# Patient Record
Sex: FEMALE | Race: WHITE | NOT HISPANIC OR LATINO
[De-identification: names, ages, dates, MRNs, and addresses within clinical notes are randomized per-mention and may not be internally consistent; named-entity substitution may affect disease eponyms.]

---

## 2017-06-02 ENCOUNTER — APPOINTMENT (OUTPATIENT)
Dept: DERMATOLOGY | Facility: CLINIC | Age: 47
End: 2017-06-02

## 2017-06-02 VITALS
BODY MASS INDEX: 23.32 KG/M2 | WEIGHT: 140 LBS | SYSTOLIC BLOOD PRESSURE: 106 MMHG | DIASTOLIC BLOOD PRESSURE: 64 MMHG | HEIGHT: 65 IN

## 2017-06-02 DIAGNOSIS — R20.2 PARESTHESIA OF SKIN: ICD-10-CM

## 2017-06-02 DIAGNOSIS — L81.0 POSTINFLAMMATORY HYPERPIGMENTATION: ICD-10-CM

## 2017-06-02 DIAGNOSIS — L30.0 NUMMULAR DERMATITIS: ICD-10-CM

## 2017-06-02 DIAGNOSIS — L73.9 FOLLICULAR DISORDER, UNSPECIFIED: ICD-10-CM

## 2017-06-02 DIAGNOSIS — H54.7 UNSPECIFIED VISUAL LOSS: ICD-10-CM

## 2017-07-03 ENCOUNTER — APPOINTMENT (OUTPATIENT)
Dept: ULTRASOUND IMAGING | Facility: IMAGING CENTER | Age: 47
End: 2017-07-03

## 2017-07-03 ENCOUNTER — OUTPATIENT (OUTPATIENT)
Dept: OUTPATIENT SERVICES | Facility: HOSPITAL | Age: 47
LOS: 1 days | End: 2017-07-03
Payer: COMMERCIAL

## 2017-07-03 DIAGNOSIS — Z00.8 ENCOUNTER FOR OTHER GENERAL EXAMINATION: ICD-10-CM

## 2017-07-03 PROCEDURE — 93970 EXTREMITY STUDY: CPT

## 2017-08-15 ENCOUNTER — APPOINTMENT (OUTPATIENT)
Dept: OBGYN | Facility: CLINIC | Age: 47
End: 2017-08-15
Payer: COMMERCIAL

## 2017-08-15 VITALS
DIASTOLIC BLOOD PRESSURE: 70 MMHG | HEIGHT: 65 IN | SYSTOLIC BLOOD PRESSURE: 108 MMHG | HEART RATE: 69 BPM | WEIGHT: 143.13 LBS | BODY MASS INDEX: 23.85 KG/M2

## 2017-08-15 DIAGNOSIS — Z87.42 PERSONAL HISTORY OF OTHER DISEASES OF THE FEMALE GENITAL TRACT: ICD-10-CM

## 2017-08-15 PROCEDURE — 99213 OFFICE O/P EST LOW 20 MIN: CPT

## 2017-08-15 RX ORDER — CLINDAMYCIN PHOSPHATE 10 MG/ML
1 SOLUTION TOPICAL
Qty: 1 | Refills: 4 | Status: DISCONTINUED | COMMUNITY
Start: 2017-06-02 | End: 2017-08-15

## 2017-08-15 RX ORDER — TRIAMCINOLONE ACETONIDE 1 MG/G
0.1 CREAM TOPICAL
Qty: 1 | Refills: 2 | Status: DISCONTINUED | COMMUNITY
Start: 2017-06-02 | End: 2017-08-15

## 2017-08-15 RX ORDER — NYSTATIN AND TRIAMCINOLONE ACETONIDE 100000; 1 MG/G; MG/G
100000-0.1 CREAM TOPICAL TWICE DAILY
Qty: 1 | Refills: 1 | Status: ACTIVE | COMMUNITY
Start: 2017-08-15 | End: 1900-01-01

## 2017-08-16 ENCOUNTER — OTHER (OUTPATIENT)
Age: 47
End: 2017-08-16

## 2017-08-16 LAB
CANDIDA VAG CYTO: NOT DETECTED
G VAGINALIS+PREV SP MTYP VAG QL MICRO: NOT DETECTED
T VAGINALIS VAG QL WET PREP: NOT DETECTED

## 2017-09-22 ENCOUNTER — APPOINTMENT (OUTPATIENT)
Dept: OTOLARYNGOLOGY | Facility: CLINIC | Age: 47
End: 2017-09-22
Payer: COMMERCIAL

## 2017-09-22 VITALS
HEIGHT: 64 IN | DIASTOLIC BLOOD PRESSURE: 72 MMHG | WEIGHT: 143 LBS | BODY MASS INDEX: 24.41 KG/M2 | SYSTOLIC BLOOD PRESSURE: 108 MMHG | HEART RATE: 62 BPM

## 2017-09-22 DIAGNOSIS — H93.13 TINNITUS, BILATERAL: ICD-10-CM

## 2017-09-22 DIAGNOSIS — Z84.0 FAMILY HISTORY OF DISEASES OF THE SKIN AND SUBCUTANEOUS TISSUE: ICD-10-CM

## 2017-09-22 DIAGNOSIS — H91.22 SUDDEN IDIOPATHIC HEARING LOSS, LEFT EAR: ICD-10-CM

## 2017-09-22 DIAGNOSIS — H91.93 UNSPECIFIED HEARING LOSS, BILATERAL: ICD-10-CM

## 2017-09-22 DIAGNOSIS — B18.2 CHRONIC VIRAL HEPATITIS C: ICD-10-CM

## 2017-09-22 DIAGNOSIS — R42 DIZZINESS AND GIDDINESS: ICD-10-CM

## 2017-09-22 PROCEDURE — 99205 OFFICE O/P NEW HI 60 MIN: CPT | Mod: 25

## 2017-09-22 PROCEDURE — 92557 COMPREHENSIVE HEARING TEST: CPT

## 2017-09-22 PROCEDURE — 92567 TYMPANOMETRY: CPT

## 2017-09-22 RX ORDER — FLUTICASONE PROPIONATE 50 UG/1
50 SPRAY, METERED NASAL
Qty: 16 | Refills: 0 | Status: ACTIVE | COMMUNITY
Start: 2017-09-12

## 2017-09-22 RX ORDER — PREDNISONE 20 MG/1
20 TABLET ORAL
Qty: 60 | Refills: 1 | Status: ACTIVE | COMMUNITY
Start: 2017-09-22 | End: 1900-01-01

## 2017-09-22 RX ORDER — ACYCLOVIR 400 MG/1
400 TABLET ORAL 3 TIMES DAILY
Qty: 30 | Refills: 1 | Status: ACTIVE | COMMUNITY
Start: 2017-09-22 | End: 1900-01-01

## 2017-09-22 RX ORDER — ACETYLCYSTEINE 600 MG
600 CAPSULE ORAL
Qty: 42 | Refills: 0 | Status: ACTIVE | COMMUNITY
Start: 2017-09-22 | End: 1900-01-01

## 2017-09-24 PROBLEM — Z84.0 FAMILY HISTORY OF PSORIASIS: Status: ACTIVE | Noted: 2017-06-02

## 2017-10-06 ENCOUNTER — APPOINTMENT (OUTPATIENT)
Dept: OTOLARYNGOLOGY | Facility: CLINIC | Age: 47
End: 2017-10-06

## 2017-10-20 ENCOUNTER — APPOINTMENT (OUTPATIENT)
Dept: OBGYN | Facility: CLINIC | Age: 47
End: 2017-10-20
Payer: COMMERCIAL

## 2017-10-20 ENCOUNTER — ASOB RESULT (OUTPATIENT)
Age: 47
End: 2017-10-20

## 2017-10-20 VITALS
BODY MASS INDEX: 24.24 KG/M2 | DIASTOLIC BLOOD PRESSURE: 79 MMHG | HEIGHT: 64 IN | HEART RATE: 67 BPM | WEIGHT: 142 LBS | SYSTOLIC BLOOD PRESSURE: 114 MMHG

## 2017-10-20 DIAGNOSIS — N92.6 IRREGULAR MENSTRUATION, UNSPECIFIED: ICD-10-CM

## 2017-10-20 DIAGNOSIS — N84.1 POLYP OF CERVIX UTERI: ICD-10-CM

## 2017-10-20 PROCEDURE — 58100 BIOPSY OF UTERUS LINING: CPT

## 2017-10-20 PROCEDURE — 99214 OFFICE O/P EST MOD 30 MIN: CPT | Mod: 25

## 2017-10-20 PROCEDURE — 81025 URINE PREGNANCY TEST: CPT

## 2017-10-20 PROCEDURE — 76830 TRANSVAGINAL US NON-OB: CPT

## 2017-10-23 LAB — CORE LAB BIOPSY: NORMAL

## 2017-10-23 RX ORDER — MEDROXYPROGESTERONE ACETATE 10 MG/1
10 TABLET ORAL DAILY
Qty: 7 | Refills: 0 | Status: ACTIVE | COMMUNITY
Start: 2017-10-23 | End: 1900-01-01

## 2017-10-24 ENCOUNTER — CHART COPY (OUTPATIENT)
Age: 47
End: 2017-10-24

## 2017-10-24 DIAGNOSIS — R93.5 ABNORMAL FINDINGS ON DIAGNOSTIC IMAGING OF OTHER ABDOMINAL REGIONS, INCLUDING RETROPERITONEUM: ICD-10-CM

## 2017-11-06 ENCOUNTER — APPOINTMENT (OUTPATIENT)
Dept: OBGYN | Facility: CLINIC | Age: 47
End: 2017-11-06

## 2017-11-07 ENCOUNTER — APPOINTMENT (OUTPATIENT)
Dept: OBGYN | Facility: HOSPITAL | Age: 47
End: 2017-11-07

## 2017-11-22 ENCOUNTER — APPOINTMENT (OUTPATIENT)
Dept: OBGYN | Facility: CLINIC | Age: 47
End: 2017-11-22

## 2019-07-06 ENCOUNTER — TRANSCRIPTION ENCOUNTER (OUTPATIENT)
Age: 49
End: 2019-07-06

## 2020-03-18 ENCOUNTER — TRANSCRIPTION ENCOUNTER (OUTPATIENT)
Age: 50
End: 2020-03-18

## 2020-11-21 ENCOUNTER — INPATIENT (INPATIENT)
Facility: HOSPITAL | Age: 50
LOS: 0 days | Discharge: HOME | End: 2020-11-22
Attending: INTERNAL MEDICINE | Admitting: INTERNAL MEDICINE
Payer: COMMERCIAL

## 2020-11-21 VITALS
RESPIRATION RATE: 16 BRPM | HEART RATE: 82 BPM | TEMPERATURE: 98 F | SYSTOLIC BLOOD PRESSURE: 100 MMHG | OXYGEN SATURATION: 100 % | DIASTOLIC BLOOD PRESSURE: 58 MMHG

## 2020-11-21 DIAGNOSIS — R10.31 RIGHT LOWER QUADRANT PAIN: ICD-10-CM

## 2020-11-21 DIAGNOSIS — R55 SYNCOPE AND COLLAPSE: ICD-10-CM

## 2020-11-21 DIAGNOSIS — Z98.891 HISTORY OF UTERINE SCAR FROM PREVIOUS SURGERY: Chronic | ICD-10-CM

## 2020-11-21 DIAGNOSIS — R10.9 UNSPECIFIED ABDOMINAL PAIN: ICD-10-CM

## 2020-11-21 LAB
APPEARANCE UR: CLEAR — SIGNIFICANT CHANGE UP
BACTERIA # UR AUTO: NEGATIVE — SIGNIFICANT CHANGE UP
BILIRUB UR-MCNC: NEGATIVE — SIGNIFICANT CHANGE UP
BLOOD UR QL VISUAL: NEGATIVE — SIGNIFICANT CHANGE UP
COLOR SPEC: SIGNIFICANT CHANGE UP
GLUCOSE UR-MCNC: NEGATIVE — SIGNIFICANT CHANGE UP
HCG UR-SCNC: NEGATIVE — SIGNIFICANT CHANGE UP
HYALINE CASTS # UR AUTO: SIGNIFICANT CHANGE UP
KETONES UR-MCNC: NEGATIVE — SIGNIFICANT CHANGE UP
LEUKOCYTE ESTERASE UR-ACNC: NEGATIVE — SIGNIFICANT CHANGE UP
NITRITE UR-MCNC: NEGATIVE — SIGNIFICANT CHANGE UP
PH UR: 6 — SIGNIFICANT CHANGE UP (ref 5–8)
PROT UR-MCNC: 20 — SIGNIFICANT CHANGE UP
RBC CASTS # UR COMP ASSIST: SIGNIFICANT CHANGE UP (ref 0–?)
SARS-COV-2 RNA SPEC QL NAA+PROBE: SIGNIFICANT CHANGE UP
SP GR SPEC: 1.01 — SIGNIFICANT CHANGE UP (ref 1–1.04)
SP GR UR: 1.01 — SIGNIFICANT CHANGE UP (ref 1–1.04)
SQUAMOUS # UR AUTO: SIGNIFICANT CHANGE UP
TROPONIN T, HIGH SENSITIVITY: 18 NG/L — SIGNIFICANT CHANGE UP (ref ?–14)
UROBILINOGEN FLD QL: NORMAL — SIGNIFICANT CHANGE UP
WBC UR QL: SIGNIFICANT CHANGE UP (ref 0–?)

## 2020-11-21 PROCEDURE — 99284 EMERGENCY DEPT VISIT MOD MDM: CPT

## 2020-11-21 PROCEDURE — 99231 SBSQ HOSP IP/OBS SF/LOW 25: CPT

## 2020-11-21 PROCEDURE — 99223 1ST HOSP IP/OBS HIGH 75: CPT

## 2020-11-21 PROCEDURE — 70450 CT HEAD/BRAIN W/O DYE: CPT | Mod: 26

## 2020-11-21 PROCEDURE — 71046 X-RAY EXAM CHEST 2 VIEWS: CPT | Mod: 26

## 2020-11-21 PROCEDURE — 74177 CT ABD & PELVIS W/CONTRAST: CPT | Mod: 26

## 2020-11-21 RX ORDER — SODIUM CHLORIDE 9 MG/ML
1000 INJECTION INTRAMUSCULAR; INTRAVENOUS; SUBCUTANEOUS ONCE
Refills: 0 | Status: COMPLETED | OUTPATIENT
Start: 2020-11-21 | End: 2020-11-21

## 2020-11-21 RX ORDER — ONDANSETRON 8 MG/1
4 TABLET, FILM COATED ORAL ONCE
Refills: 0 | Status: COMPLETED | OUTPATIENT
Start: 2020-11-21 | End: 2020-11-21

## 2020-11-21 RX ORDER — ONDANSETRON 8 MG/1
4 TABLET, FILM COATED ORAL EVERY 6 HOURS
Refills: 0 | Status: DISCONTINUED | OUTPATIENT
Start: 2020-11-21 | End: 2020-11-22

## 2020-11-21 RX ADMIN — Medication 30 MILLILITER(S): at 23:39

## 2020-11-21 RX ADMIN — SODIUM CHLORIDE 1000 MILLILITER(S): 9 INJECTION INTRAMUSCULAR; INTRAVENOUS; SUBCUTANEOUS at 09:06

## 2020-11-21 RX ADMIN — ONDANSETRON 4 MILLIGRAM(S): 8 TABLET, FILM COATED ORAL at 09:41

## 2020-11-21 NOTE — ED PROVIDER NOTE - ATTENDING CONTRIBUTION TO CARE
I have personally performed a face to face bedside history and physical examination of this patient. I have discussed the history, examination, review of systems, assessment and plan of management with the resident. I have reviewed the electronic medical record and amended it to reflect my history, review of systems, physical exam, assessment and plan.    Brief HPI:  50 yo F no sig pmh presents with abdominal pain and loss of consciousness.  Currently on azithromycin course for fever, now resolved.  VSS AF.  Exam non-toxic appearing, abdomen tender in rlq however non-peritoneal, neuro exam non-focal.  EKG non-ischemic; no morphology c/w brugada, wpw, pronged qt, hocm, arvh.  F    Vitals:   Reviewed    Exam:    GEN:  Non-toxic appearing, non-distressed, speaking full sentences, non-diaphoretic, AAOx3  HEENT:  NCAT, neck supple, EOMI, PERRLA, sclera anicteric, no conjunctival pallor or injection, no stridor, normal voice, no tonsillar exudate, uvula midline, tympanic membranes and external auditory canals normal appearing bilaterally   CV:  regular rhythm and rate, s1/s2 audible, no murmurs, rubs or gallops, peripheral pulses 2+ and symmetric  PULM:  non-labored respirations, lungs clear to auscultation bilaterally, no wheezes, crackles or rales  ABD:  non distended, non-tender, no rebound, no guarding, negative Lovett's sign, bowel sounds normal, no cvat  MSK:  no gross deformity, non-tender extremities and joints, range of motion grossly normal appearing, no extremity edema, extremities warm and well perfused   NEURO:  AAOx3, CN II-XII intact, motor 5/5 in upper and lower extremities bilaterally, sensation grossly intact in extremities and trunk, finger to nose testing wnl, no nystagmus, negative Romberg, no pronator drift, no gait deficit  SKIN:  warm, dry, no rash or vesicles     A/P:

## 2020-11-21 NOTE — ED PROVIDER NOTE - PHYSICAL EXAMINATION
GEN:  Non-toxic appearing, non-distressed, speaking full sentences, non-diaphoretic, AAOx3  HEENT:  NCAT, neck supple, EOMI, PERRLA, sclera anicteric, no conjunctival pallor or injection, no stridor, normal voice, no tonsillar exudate, uvula midline, tympanic membranes and external auditory canals normal appearing bilaterally   CV:  regular rhythm and rate, s1/s2 audible, no murmurs, rubs or gallops, peripheral pulses 2+ and symmetric  PULM:  non-labored respirations, lungs clear to auscultation bilaterally, no wheezes, crackles or rales  ABD:  non distended, ttp in rlq, no rebound, no guarding, negative Lovett's sign, bowel sounds normal, no cvat  MSK:  no gross deformity, non-tender extremities and joints, range of motion grossly normal appearing, no extremity edema, extremities warm and well perfused   NEURO:  AAOx3, CN II-XII intact, motor 5/5 in upper and lower extremities bilaterally, sensation grossly intact in extremities and trunk, finger to nose testing wnl, no nystagmus, negative Romberg, no pronator drift, no gait deficit  SKIN:  warm, dry, no rash or vesicles

## 2020-11-21 NOTE — ED PROVIDER NOTE - OBJECTIVE STATEMENT
50 yo F no sig pmh presents with abdominal pain and loss of consciousness.  Patient reports pain is rlq, started this morning, sharp, radiating to right flank and right leg, 10/10, no aggravating or alleviating factors.  Patient left his pain this morning and had witness episode of loss of consciousness (son denies head trauma), lasting several minutes with period of confusion and "acting manic" (per son) after regaining consciousness.  No reported convulsions, tongue biting, urinary incontinence.  Currently states she is nauseated.  Currently on day 5 of z-jean started by pcp for several days of fever and cough, however patient reports resolution of fever in last 3 days and negative covid-19 pcr.  Patient reports rlq pain initially started ~7 years ago with negative workup including CT, sonogram, and GYN visit.  Reports previous syncope episode ~3 years ago with negative workup including echo and stress testing.  Patient denies cp, sob, palpitation, chills, vomiting, bloody or dark stool.  Denies etoh or illicit drug use.  No sick contacts, recent travel.  No family history of early cardiac death.

## 2020-11-21 NOTE — H&P ADULT - PROBLEM SELECTOR PLAN 1
- Suspect vasovagal in setting of severe abdominal pain  - EKG nml. Trop 12->18. No chest pain since arriving in ED   - Monitor on tele   - Obtain TTE

## 2020-11-21 NOTE — H&P ADULT - NSHPLABSRESULTS_GEN_ALL_CORE
.  LABS:                         11.0   6.75  )-----------( 261      ( 2020 09:05 )             34.6     -    142  |  107  |  15  ----------------------------<  107<H>  3.9   |  26  |  0.68    Ca    8.9      2020 09:05    TPro  7.2  /  Alb  4.4  /  TBili  0.2  /  DBili  x   /  AST  22  /  ALT  32  /  AlkPhos  60        Urinalysis Basic - ( 2020 09:25 )    Color: LIGHT YELLOW / Appearance: CLEAR / S.014 / pH: 6.0  Gluc: NEGATIVE / Ketone: NEGATIVE  / Bili: NEGATIVE / Urobili: NORMAL   Blood: NEGATIVE / Protein: 20 / Nitrite: NEGATIVE   Leuk Esterase: NEGATIVE / RBC: 0-2 / WBC 0-2   Sq Epi: OCC / Non Sq Epi: x / Bacteria: NEGATIVE          EKG reviewed personally: NSR 75 bpm      RADIOLOGY, EKG & ADDITIONAL TESTS: Reviewed.

## 2020-11-21 NOTE — H&P ADULT - NSHPREVIEWOFSYSTEMS_GEN_ALL_CORE
REVIEW OF SYSTEMS:    CONSTITUTIONAL: No weakness, fevers or chills, no weight loss  EYES/ENT: No visual changes;  No dysphagia or odynophagia, no tinnitus  NECK: No pain or stiffness  RESPIRATORY: No cough, wheezing, hemoptysis; No shortness of breath  CARDIOVASCULAR: No chest pain or palpitations; No lower extremity edema  GASTROINTESTINAL: +  abdominal pain. + nausea, no vomiting, or hematemesis; No diarrhea or constipation. No melena or hematochezia.  MUSCULOSKELETAL: No joint pain, swelling, erythema or warmth, no back pain  GENITOURINARY: No dysuria, frequency or hematuria, no suprapubic pain  NEUROLOGICAL: + syncope, No numbness or weakness, no headache, no gait abnormalities   SKIN: No itching, burning, rashes, or lesions   All other review of systems is negative unless indicated above.

## 2020-11-21 NOTE — ED ADULT NURSE NOTE - OBJECTIVE STATEMENT
pt arrives c/o rt lower quadrant pain off and on for years--pt states pain was so bad that she "passed out"--pt appears in no acute distress--pt placed on monitor--NSR. Pts v/s stable afebrile--color pink,skin warm and dry--pt alert and orientated x3. Pt had #20 placed rt antecubital--labs drawn and sent--pt receiving 1 liter NS at present,awaiting CAT scan

## 2020-11-21 NOTE — H&P ADULT - PROBLEM SELECTOR PLAN 2
- Etiology unclear. Appears to have been transient. has had this pain in the past with extensive workup that was negative  - CT abdomen neg  - Zofran prn nausea

## 2020-11-21 NOTE — ED PROVIDER NOTE - PATIENT PORTAL LINK FT
You can access the FollowMyHealth Patient Portal offered by [facility] by registering at the following website: [email or web address]. By joining [email or web address]’s FollowMyHealth portal, you will also be able to view your health information using other applications (apps) compatible with our system.

## 2020-11-21 NOTE — ED ADULT TRIAGE NOTE - ESI TRIAGE ACUITY LEVEL, MLM
10/11/19  VOICEMAIL  1. Caller Name: Meredith Corral                      Call Back Number: [phone] (home)     2. Message: medications not covered well and its too expensive. Can we change back or up the other medication?    3. Patient approves office to leave a detailed voicemail/MyChart message: yes    ______________________________________________    10/11/19  Mychart msg: I can not get the Victoza w/my ins it costs why to much.  Can you please up the Tresiba dose form 50 units a night to 70 please to balance out no longer being able to take Victoza.  I also tried to get my prescription filled for Jardiance and they had to send over a request to your office for a authorization from my ins      Asked Pt to contact insurance in regards to Victoza cost and any better covered alternatives.   Please advise on Tresiba dose  Looking into the PA for Jardiance and offered samples of medication whiel we are getting this figured out.     _______________________________________________    10/17/19  Jardiance approved for coverage.     Pt needs to know what to do with the Tresiba dose. She would like to increase the dose since she cant afford the Victoza. Please advise       
3

## 2020-11-21 NOTE — H&P ADULT - NSHPPHYSICALEXAM_GEN_ALL_CORE
T(C): 36.8 (11-21-20 @ 15:11), Max: 36.8 (11-21-20 @ 15:11)  HR: 80 (11-21-20 @ 15:11) (76 - 82)  BP: 108/66 (11-21-20 @ 15:11) (100/58 - 114/70)  RR: 18 (11-21-20 @ 15:11) (16 - 18)  SpO2: 100% (11-21-20 @ 15:11) (100% - 100%)    GENERAL: No acute distress, well-developed  HEAD:  Atraumatic, Normocephalic  ENT: EOMI, PERRLA, conjunctiva and sclera clear, Neck supple, No JVD, moist mucosa, no pharyngeal erythema, no tonsillar enlargement or exudate  CHEST/LUNG: Clear to auscultation bilaterally; No wheeze, equal breath sounds bilaterally   HEART: Regular rate and rhythm; No murmurs, rubs, or gallops  ABDOMEN: Soft, Nontender, Nondistended; Bowel sounds present, no organomegaly  EXTREMITIES:  2+ Peripheral Pulses, No clubbing, cyanosis, or edema  PSYCH: AAOx3, normal affect, normal behavior   NEUROLOGY: non-focal, cranial nerves intact  SKIN: Normal color, No rashes or lesions

## 2020-11-21 NOTE — PATIENT PROFILE ADULT - NSPROMEDSBROUGHTTOHOSP_GEN_A_NUR
Partial Purse String (Intermediate) Text: Given the location of the defect and the characteristics of the surrounding skin an intermediate purse string closure was deemed most appropriate.  Undermining was performed circumfirentially around the surgical defect.  A purse string suture was then placed and tightened. Wound tension only allowed a partial closure of the circular defect. no

## 2020-11-21 NOTE — ED PROVIDER NOTE - NSFOLLOWUPINSTRUCTIONS_ED_ALL_ED_FT
1.  Please return to care for worsening abdominal pain, chest pain, passing out, fever, chills, cough, nausea, vomiting.   2.  Follow with your primary care doctor and gyn as early as able.

## 2020-11-21 NOTE — ED PROVIDER NOTE - PROGRESS NOTE DETAILS
SERRATO:  Labs, ua reassuring.  CT head and abdomen/pelvis negative for acute pathology.  Patient reports improved sx, no syncopal episodes in ED.  Abdominal exam mild ttp in rlq, no guarding or rigidity.  I offered patient pelvic exam to asses for ovarian or gyn etiology of patient but patient declines given chronicity of sx and ability to follow with gyn.   I spoke with patient regarding staying in CDU for echo give syncopal episode and patient declines and will follow with pcp.   Will discharge. SERRATO:  Delta trop 6.  Patient without chest pain or anginal sx.  Will admit for cardiac workup.

## 2020-11-21 NOTE — H&P ADULT - HISTORY OF PRESENT ILLNESS
48 yo F with no sig pmh presenting after syncopal episode at home. Pt states that she woke up with sharp 10/10 pain in abdomen in RLQ, non radiating. She was nauseous but did not vomit and had no diarrhea. After waking up with this pain she became lightheaded and lost consciousness. She denies any head trauma. She woke up after a couple of minutes and her abdominal pain had resolved. She was confused, still feeling lightheaded and nauseous but also felt a pressure on her chest. She denies any palpitations or shortness of breath. Of note, she recently had a fever about 5 days ago and has been on Z pac. She reports having a negative COVID-19 test 3 days ago. She has not traveled and denies any sick contacts.     In ED pt was given Zofran 4mg IV and 1L NS   VS:  114/70  76  98.0  18  100% on RA

## 2020-11-21 NOTE — ED ADULT TRIAGE NOTE - CHIEF COMPLAINT QUOTE
Pt c/o RLQ abdomen pain, intermittent, starting this morning, pt had syncopal episode related to pain. Pt + nausea, no vomiting. Pt currently on z-pac for flu-like symptoms (cough). Pt has tested covid negative twice in the past.

## 2020-11-22 ENCOUNTER — TRANSCRIPTION ENCOUNTER (OUTPATIENT)
Age: 50
End: 2020-11-22

## 2020-11-22 VITALS
OXYGEN SATURATION: 100 % | SYSTOLIC BLOOD PRESSURE: 104 MMHG | HEART RATE: 70 BPM | DIASTOLIC BLOOD PRESSURE: 74 MMHG | TEMPERATURE: 98 F | RESPIRATION RATE: 16 BRPM

## 2020-11-22 LAB
ALBUMIN SERPL ELPH-MCNC: 4.2 G/DL — SIGNIFICANT CHANGE UP (ref 3.3–5)
ALP SERPL-CCNC: 57 U/L — SIGNIFICANT CHANGE UP (ref 40–120)
ALT FLD-CCNC: 28 U/L — SIGNIFICANT CHANGE UP (ref 4–33)
ANION GAP SERPL CALC-SCNC: 8 MMO/L — SIGNIFICANT CHANGE UP (ref 7–14)
APTT BLD: 32.2 SEC — SIGNIFICANT CHANGE UP (ref 27–36.3)
AST SERPL-CCNC: 19 U/L — SIGNIFICANT CHANGE UP (ref 4–32)
BASOPHILS # BLD AUTO: 0.05 K/UL — SIGNIFICANT CHANGE UP (ref 0–0.2)
BASOPHILS NFR BLD AUTO: 0.5 % — SIGNIFICANT CHANGE UP (ref 0–2)
BILIRUB SERPL-MCNC: 0.5 MG/DL — SIGNIFICANT CHANGE UP (ref 0.2–1.2)
BUN SERPL-MCNC: 11 MG/DL — SIGNIFICANT CHANGE UP (ref 7–23)
CALCIUM SERPL-MCNC: 9.2 MG/DL — SIGNIFICANT CHANGE UP (ref 8.4–10.5)
CHLORIDE SERPL-SCNC: 106 MMOL/L — SIGNIFICANT CHANGE UP (ref 98–107)
CO2 SERPL-SCNC: 27 MMOL/L — SIGNIFICANT CHANGE UP (ref 22–31)
CREAT SERPL-MCNC: 0.8 MG/DL — SIGNIFICANT CHANGE UP (ref 0.5–1.3)
CULTURE RESULTS: SIGNIFICANT CHANGE UP
EOSINOPHIL # BLD AUTO: 0.27 K/UL — SIGNIFICANT CHANGE UP (ref 0–0.5)
EOSINOPHIL NFR BLD AUTO: 2.8 % — SIGNIFICANT CHANGE UP (ref 0–6)
GLUCOSE SERPL-MCNC: 101 MG/DL — HIGH (ref 70–99)
HCT VFR BLD CALC: 33.2 % — LOW (ref 34.5–45)
HGB BLD-MCNC: 10.5 G/DL — LOW (ref 11.5–15.5)
IMM GRANULOCYTES NFR BLD AUTO: 0.4 % — SIGNIFICANT CHANGE UP (ref 0–1.5)
LYMPHOCYTES # BLD AUTO: 2.54 K/UL — SIGNIFICANT CHANGE UP (ref 1–3.3)
LYMPHOCYTES # BLD AUTO: 26.7 % — SIGNIFICANT CHANGE UP (ref 13–44)
MAGNESIUM SERPL-MCNC: 2.3 MG/DL — SIGNIFICANT CHANGE UP (ref 1.6–2.6)
MCHC RBC-ENTMCNC: 26.4 PG — LOW (ref 27–34)
MCHC RBC-ENTMCNC: 31.6 % — LOW (ref 32–36)
MCV RBC AUTO: 83.6 FL — SIGNIFICANT CHANGE UP (ref 80–100)
MONOCYTES # BLD AUTO: 0.89 K/UL — SIGNIFICANT CHANGE UP (ref 0–0.9)
MONOCYTES NFR BLD AUTO: 9.4 % — SIGNIFICANT CHANGE UP (ref 2–14)
NEUTROPHILS # BLD AUTO: 5.72 K/UL — SIGNIFICANT CHANGE UP (ref 1.8–7.4)
NEUTROPHILS NFR BLD AUTO: 60.2 % — SIGNIFICANT CHANGE UP (ref 43–77)
NRBC # FLD: 0 K/UL — SIGNIFICANT CHANGE UP (ref 0–0)
PHOSPHATE SERPL-MCNC: 3.2 MG/DL — SIGNIFICANT CHANGE UP (ref 2.5–4.5)
PLATELET # BLD AUTO: 265 K/UL — SIGNIFICANT CHANGE UP (ref 150–400)
PMV BLD: 11.4 FL — SIGNIFICANT CHANGE UP (ref 7–13)
POTASSIUM SERPL-MCNC: 4 MMOL/L — SIGNIFICANT CHANGE UP (ref 3.5–5.3)
POTASSIUM SERPL-SCNC: 4 MMOL/L — SIGNIFICANT CHANGE UP (ref 3.5–5.3)
PROT SERPL-MCNC: 7 G/DL — SIGNIFICANT CHANGE UP (ref 6–8.3)
RBC # BLD: 3.97 M/UL — SIGNIFICANT CHANGE UP (ref 3.8–5.2)
RBC # FLD: 12.8 % — SIGNIFICANT CHANGE UP (ref 10.3–14.5)
SODIUM SERPL-SCNC: 141 MMOL/L — SIGNIFICANT CHANGE UP (ref 135–145)
SPECIMEN SOURCE: SIGNIFICANT CHANGE UP
TROPONIN T, HIGH SENSITIVITY: 12 NG/L — SIGNIFICANT CHANGE UP (ref ?–14)
WBC # BLD: 9.51 K/UL — SIGNIFICANT CHANGE UP (ref 3.8–10.5)
WBC # FLD AUTO: 9.51 K/UL — SIGNIFICANT CHANGE UP (ref 3.8–10.5)

## 2020-11-22 PROCEDURE — 71275 CT ANGIOGRAPHY CHEST: CPT | Mod: 26

## 2020-11-22 PROCEDURE — 93306 TTE W/DOPPLER COMPLETE: CPT | Mod: 26

## 2020-11-22 RX ORDER — HEPARIN SODIUM 5000 [USP'U]/ML
2500 INJECTION INTRAVENOUS; SUBCUTANEOUS EVERY 6 HOURS
Refills: 0 | Status: DISCONTINUED | OUTPATIENT
Start: 2020-11-22 | End: 2020-11-22

## 2020-11-22 RX ORDER — HEPARIN SODIUM 5000 [USP'U]/ML
5500 INJECTION INTRAVENOUS; SUBCUTANEOUS EVERY 6 HOURS
Refills: 0 | Status: DISCONTINUED | OUTPATIENT
Start: 2020-11-22 | End: 2020-11-22

## 2020-11-22 RX ORDER — HEPARIN SODIUM 5000 [USP'U]/ML
INJECTION INTRAVENOUS; SUBCUTANEOUS
Qty: 25000 | Refills: 0 | Status: DISCONTINUED | OUTPATIENT
Start: 2020-11-22 | End: 2020-11-22

## 2020-11-22 RX ORDER — HEPARIN SODIUM 5000 [USP'U]/ML
5000 INJECTION INTRAVENOUS; SUBCUTANEOUS EVERY 12 HOURS
Refills: 0 | Status: DISCONTINUED | OUTPATIENT
Start: 2020-11-22 | End: 2020-11-22

## 2020-11-22 RX ADMIN — HEPARIN SODIUM 1200 UNIT(S)/HR: 5000 INJECTION INTRAVENOUS; SUBCUTANEOUS at 16:38

## 2020-11-22 NOTE — DISCHARGE NOTE PROVIDER - CARE PROVIDER_API CALL
Markos Rolle I  INTERNAL MEDICINE  [address] [place] [address]  Phone: [phone]  Fax: [phone]  Follow Up Time:

## 2020-11-22 NOTE — DISCHARGE NOTE PROVIDER - NSDCCPCAREPLAN_GEN_ALL_CORE_FT
PRINCIPAL DISCHARGE DIAGNOSIS  Diagnosis: Syncope, unspecified syncope type  Assessment and Plan of Treatment: You were evaluated by cardiology   You underwent and echocardiogram

## 2020-11-22 NOTE — CONSULT NOTE ADULT - SUBJECTIVE AND OBJECTIVE BOX
Cardiovascular Disease Initial Evaluation    CHIEF COMPLAINT: Passing out    HISTORY OF PRESENT ILLNESS:  This is a 49 year old woman with no reported cardiac history who presented to [facility] on 2020 after a syncopal episode at home. Pt states that she woke up with sharp 10/10 pain in abdomen in RLQ, non radiating. She was nauseous but did not vomit and had no diarrhea. After waking up with this pain she became lightheaded and lost consciousness. She denies any head trauma. She woke up after a couple of minutes and her abdominal pain had resolved. She was confused, still feeling lightheaded and nauseous but also felt a pressure on her chest. She denies any palpitations or shortness of breath. Of note, she recently had a fever about 5 days ago and has been on Z pac. She reports having a negative COVID-19 test 3 days ago. She has not traveled and denies any sick contacts.   Currently she is resting comfortably, but has a persistent cough.   She denies chest pain or SOB. No further abdominal pain or syncope.     Allergies  No Known Allergies        MEDICATIONS:    ondansetron Injectable 4 milliGRAM(s) IV Push every 6 hours PRN      PAST MEDICAL & SURGICAL HISTORY:  H/O:         FAMILY HISTORY:  FH: HTN (hypertension)        SOCIAL HISTORY:    The patient is a nonsmoker       REVIEW OF SYSTEMS:  See HPI, otherwise complete 14 point review of systems negative        PHYSICAL EXAM:  T(C): 36.7 (20 @ 04:43), Max: 36.9 (20 @ 20:43)  HR: 63 (20 @ 04:43) (63 - 80)  BP: 100/62 (20 @ 04:43) (100/62 - 114/70)  RR: 18 (20 @ 04:43) (18 - 18)  SpO2: 100% (20 @ 04:43) (100% - 100%)  Wt(kg): --  I&O's Summary      Appearance: No Acute Distress; resting comfortably  HEENT:  Normal oral mucosa, PERRL, EOMI	  Cardiovascular: Normal S1 S2, No JVD, No murmurs/rubs/gallops  Respiratory: Normal respiratory effort; Lungs clear to auscultation bilaterally  Gastrointestinal:  Soft, Non-tender, + BS	  Skin: No rashes, No ecchymoses, No cyanosis	  Neurologic: Non-focal; no weakness  Extremities: No clubbing, cyanosis or edema  Vascular: Peripheral pulses palpable 2+ bilaterally  Psychiatry: A & O x 3, Mood & affect appropriate    Laboratory Data:	 	    CBC Full  -  ( 2020 06:30 )  WBC Count : 9.51 K/uL  Hemoglobin : 10.5 g/dL  Hematocrit : 33.2 %  Platelet Count - Automated : 265 K/uL  Mean Cell Volume : 83.6 fL  Mean Cell Hemoglobin : 26.4 pg  Mean Cell Hemoglobin Concentration : 31.6 %  Auto Neutrophil # : 5.72 K/uL  Auto Lymphocyte # : 2.54 K/uL  Auto Monocyte # : 0.89 K/uL  Auto Eosinophil # : 0.27 K/uL  Auto Basophil # : 0.05 K/uL  Auto Neutrophil % : 60.2 %  Auto Lymphocyte % : 26.7 %  Auto Monocyte % : 9.4 %  Auto Eosinophil % : 2.8 %  Auto Basophil % : 0.5 %        141  |  106  |  11  ----------------------------<  101<H>  4.0   |  27  |  0.80      142  |  107  |  15  ----------------------------<  107<H>  3.9   |  26  |  0.68    Ca    9.2      2020 06:30  Ca    8.9      2020 09:05  Phos  3.2     -  Mg     2.3         TPro  7.0  /  Alb  4.2  /  TBili  0.5  /  DBili  x   /  AST  19  /  ALT  28  /  AlkPhos  57    TPro  7.2  /  Alb  4.4  /  TBili  0.2  /  DBili  x   /  AST  22  /  ALT  32  /  AlkPhos  60          Interpretation of Telemetry: Sinus; no ectopy	    ECG:  	Normal sinus rhythm    Assessment: 49 year old woman with no cardiac history presents with nausea and syncope.     Plan of Care:    #Syncope-  Likely vasovagal in etiology given association with abdominal pain.  EKG is normal sinus and no ectopy noted on telemetry.  Rise in troponins noted.  Please obtain a morning troponin.  I expedited her echo for this morning.    Discharge planning today if troponins are trending down and echo is without acute findings.    #ACP (advance care planning)-  Advanced care planning was discussed with the patient.  Cardiac findings were discussed in detail and all questions were answered.      42 minutes spent on total encounter; more than 50% of the visit was spent counseling and/or coordinating care by the attending physician.   	  Markos Rolle MD [facility]  Cardiovascular Diseases  [phone]

## 2020-11-22 NOTE — DISCHARGE NOTE PROVIDER - HOSPITAL COURSE
49 year old woman with no cardiac history presents with nausea and syncope.   Syncope - Likely vasovagal in etiology given association with abdominal pain.  EKG is normal sinus and no ectopy noted on telemetry.  Rise in troponins noted - improved - down to 12     49 year old woman with no cardiac history presents with nausea and syncope.   Syncope - Likely vasovagal in etiology given association with abdominal pain.  CT Abdomen/Pelvis negative   EKG is normal sinus and no ectopy noted on telemetry.  Rise in troponins noted - improved - down to 12    Echo showed echogenic material /filling defect is seen in the IVC. There is also small mobile echodensity seen at the IVC just after the first hepatic vein.  Differential includes: thrombi vs mass.  She was started on Heparin drip.  CT Angio of chest  showed 2 mm left upper lobe pulmonary nodule. A 1 year follow-up noncontrast chest CT can be performed to ensure stability as clinically warranted.  Patient is currently asymptomatic.  Results discussed with dr. Rolle, she is cleared for discharge. 49 year old woman with no cardiac history presents with nausea and syncope.   Syncope - Likely vasovagal in etiology given association with abdominal pain.  CT Abdomen/Pelvis negative   EKG is normal sinus and no ectopy noted on telemetry.  Rise in troponins noted - improved - down to 12    Echo showed echogenic material /filling defect is seen in the IVC. There is also small mobile echodensity seen at the IVC just after the first hepatic vein.  Differential includes: thrombi vs mass.  She was started on Heparin drip.  CT Angio of chest  showed 2 mm left upper lobe pulmonary nodule, but no evidence of thrombus. A 1 year follow-up noncontrast chest CT can be performed to ensure stability as clinically warranted.  Patient is currently asymptomatic.  Results discussed with Dr. Rolle, she is cleared for discharge.

## 2020-11-22 NOTE — DISCHARGE NOTE NURSING/CASE MANAGEMENT/SOCIAL WORK - PATIENT PORTAL LINK FT
You can access the FollowMyHealth Patient Portal offered by [facility] by registering at the following website: [email or web address]. By joining [facility]’s FollowMyHealth portal, you will also be able to view your health information using other applications (apps) compatible with our system.

## 2020-12-15 PROBLEM — Z87.42 HISTORY OF VULVOVAGINITIS: Status: RESOLVED | Noted: 2017-08-15 | Resolved: 2020-12-15

## 2021-06-21 NOTE — PATIENT PROFILE ADULT - FALL HARM RISK TYPE OF ASSESSMENT
Mobile Cardiac Telemetry (MCOT) Instructions     On 06/21/21 you had a Mobile Cardiac Telemetry Monitor (MCOT).  The monitor is to be worn for 1 week. Your monitor completion date is 06/28/21.  The monitor continuously records your heart rhythm.  Any recorded events are immediately sent to a cardiac technician for review.    Daily Use and Operation    DO  · Keep the monitor within 30 feet of you at all times.  · When you feel a symptom, press the \"Record Symptoms\" button and follow prompts on monitor.  · Shower or exercise as normal while wearing the MCOT Patch.  Do not swim or take a bath.  Patch is water-resistant, not waterproof.  · Follow your normal routine.  Don't avoid stress, work, or exercise.  It is important to record your heartbeat during your normal daily activities.  · When the battery is low, use the supplied .  The monitor will show a warning message when the battery is low.  · Charge the monitor daily with the  provided.  · Address all alerts on your monitor promptly.    DO NOT  · Bathe or swim with any monitor components.  The monitor CAN be worn in the shower.  · Remove the patch from your skin after you begin monitoring.  With normal wear, each patch should last 5-7 days.    GETTING STARTED  • Wash and dry the area where patch is to be applied. (SHAVE if needed prior to washing and drying)  • Once skin is dry, scrub the area with the provided scrub pad for one minute.  Do not apply lotions or oils to the area.  • Remove a patch from the MCOT Patch Pouch.  Place sensor into patch and press down firmly to snap it in place.  • Locate the patch placement template and follow it's instructions for use.  • Remove clear backing and apply patch to your chest using the patch placement template as a guide.  It may be helpful to use a mirror for guidance.  Remove template when finished.  • Press patch firmly against your skin then remove top white paper.  • Push on the power button on the  monitor.  Follow the on screen guidance to complete the set-up.    CHANGING PATCHES  • Power off the monitor.  Remove the patch by pulling the clear adhesive away from your body.  • Apply downward pressure on the tab to snap/break it off.  This will require some force.  • Hold and slide the sensor forward to remove it from patch.  • Discard the used patch, NOT THE SENSOR. The sensor will be re-used on a new patch after sensor is recharged.  • Charge the sensor and charge the monitor.  Charging of both devices can take up to 90 minutes.  • Once sensor is fully charged, apply a new patch to your chest.    WHAT TO EXPECT  • Your physician prescribes a [employer] Heart device for you.  • The monitor is applied in the office or shipped to your home. *Device will be received within 2 business days after Insurance Verification has been completed.  • Start your monitoring service within 24 hours of receipt of the monitor if it is mailed to you. For assistance, follow the steps in the quick start instructions or the Patient Education Guide in the kit, or watch the video tutorial at [email or web address]  • [employer] [facility] customer Support team may call you. Please answer a call that may come from an [phone] (toll-free) number or ([phone]) area code.  • Trained cardiac technicians from [email or web address] [facility] review data during and at the end of your monitoring period, and in some cases may contact your physician.  • Reports and alerts posted to an online portal for your physician to review and follow-up with you.  • Return the equipment via pre-paid shipping envelope.  • Reports are provided to physician to interpret, diagnosis and follow up with you.    TROUBLESHOOTING  • A \"No Communication\" message on your monitor means the sensor is out of range.  To resolve the issue, keep the monitor within a range of 30 feet.  If the issue persists more than 15 minutes, contact Customer Service at [phone].  • If you are in an area with no or limited  cellular coverage you may receive a message.  To resolve the issue, move to an area with cellular coverage.  If you are unable to do so, the monitor will store the data and transmit when service becomes available.  • If you experience discomfort with the patch anytime during monitoring, a lead wire adapter is available in the kit for alternate use.    How to Return monitor at End of Service  • Returning your monitor promptly is important.  Simply pack up the device and components back into the kit.  Place the kit into the enclosed postage-paid bag and follow the instructions to return the device.     admission

## 2023-04-03 ENCOUNTER — EMERGENCY (EMERGENCY)
Facility: HOSPITAL | Age: 53
LOS: 1 days | Discharge: HOME | End: 2023-04-03
Attending: EMERGENCY MEDICINE
Payer: COMMERCIAL

## 2023-04-03 VITALS
DIASTOLIC BLOOD PRESSURE: 77 MMHG | HEIGHT: 65 IN | WEIGHT: 160.06 LBS | HEART RATE: 71 BPM | TEMPERATURE: 98 F | RESPIRATION RATE: 18 BRPM | OXYGEN SATURATION: 99 % | SYSTOLIC BLOOD PRESSURE: 116 MMHG

## 2023-04-03 VITALS
OXYGEN SATURATION: 100 % | SYSTOLIC BLOOD PRESSURE: 114 MMHG | TEMPERATURE: 97 F | RESPIRATION RATE: 18 BRPM | DIASTOLIC BLOOD PRESSURE: 75 MMHG | HEART RATE: 80 BPM

## 2023-04-03 DIAGNOSIS — Z98.891 HISTORY OF UTERINE SCAR FROM PREVIOUS SURGERY: Chronic | ICD-10-CM

## 2023-04-03 LAB
ALBUMIN SERPL ELPH-MCNC: 4.1 G/DL — SIGNIFICANT CHANGE UP (ref 3.5–5)
ALP SERPL-CCNC: 79 U/L — SIGNIFICANT CHANGE UP (ref 40–120)
ALT FLD-CCNC: 32 U/L DA — SIGNIFICANT CHANGE UP (ref 10–60)
ANION GAP SERPL CALC-SCNC: -1 MMOL/L — LOW (ref 5–17)
APPEARANCE UR: CLEAR — SIGNIFICANT CHANGE UP
AST SERPL-CCNC: 24 U/L — SIGNIFICANT CHANGE UP (ref 10–40)
BACTERIA # UR AUTO: ABNORMAL /HPF
BASOPHILS # BLD AUTO: 0.12 K/UL — SIGNIFICANT CHANGE UP (ref 0–0.2)
BASOPHILS NFR BLD AUTO: 1 % — SIGNIFICANT CHANGE UP (ref 0–2)
BILIRUB SERPL-MCNC: 0.4 MG/DL — SIGNIFICANT CHANGE UP (ref 0.2–1.2)
BILIRUB UR-MCNC: NEGATIVE — SIGNIFICANT CHANGE UP
BUN SERPL-MCNC: 17 MG/DL — SIGNIFICANT CHANGE UP (ref 7–18)
CALCIUM SERPL-MCNC: 9.2 MG/DL — SIGNIFICANT CHANGE UP (ref 8.4–10.5)
CHLORIDE SERPL-SCNC: 108 MMOL/L — SIGNIFICANT CHANGE UP (ref 96–108)
CO2 SERPL-SCNC: 28 MMOL/L — SIGNIFICANT CHANGE UP (ref 22–31)
COD CRY URNS QL: ABNORMAL /HPF
COLOR SPEC: YELLOW — SIGNIFICANT CHANGE UP
CREAT SERPL-MCNC: 0.85 MG/DL — SIGNIFICANT CHANGE UP (ref 0.5–1.3)
DIFF PNL FLD: ABNORMAL
EGFR: 82 ML/MIN/1.73M2 — SIGNIFICANT CHANGE UP
EOSINOPHIL # BLD AUTO: 1.99 K/UL — HIGH (ref 0–0.5)
EOSINOPHIL NFR BLD AUTO: 17 % — HIGH (ref 0–6)
EPI CELLS # UR: SIGNIFICANT CHANGE UP /HPF
GLUCOSE SERPL-MCNC: 93 MG/DL — SIGNIFICANT CHANGE UP (ref 70–99)
GLUCOSE UR QL: NEGATIVE — SIGNIFICANT CHANGE UP
HCG UR QL: NEGATIVE — SIGNIFICANT CHANGE UP
HCT VFR BLD CALC: 39.2 % — SIGNIFICANT CHANGE UP (ref 34.5–45)
HGB BLD-MCNC: 12.5 G/DL — SIGNIFICANT CHANGE UP (ref 11.5–15.5)
KETONES UR-MCNC: NEGATIVE — SIGNIFICANT CHANGE UP
LEUKOCYTE ESTERASE UR-ACNC: ABNORMAL
LIDOCAIN IGE QN: 156 U/L — SIGNIFICANT CHANGE UP (ref 73–393)
LYMPHOCYTES # BLD AUTO: 46 % — HIGH (ref 13–44)
LYMPHOCYTES # BLD AUTO: 5.37 K/UL — HIGH (ref 1–3.3)
MAGNESIUM SERPL-MCNC: 2.3 MG/DL — SIGNIFICANT CHANGE UP (ref 1.6–2.6)
MANUAL SMEAR VERIFICATION: SIGNIFICANT CHANGE UP
MCHC RBC-ENTMCNC: 27.1 PG — SIGNIFICANT CHANGE UP (ref 27–34)
MCHC RBC-ENTMCNC: 31.9 GM/DL — LOW (ref 32–36)
MCV RBC AUTO: 85 FL — SIGNIFICANT CHANGE UP (ref 80–100)
MONOCYTES # BLD AUTO: 0.7 K/UL — SIGNIFICANT CHANGE UP (ref 0–0.9)
MONOCYTES NFR BLD AUTO: 6 % — SIGNIFICANT CHANGE UP (ref 2–14)
NEUTROPHILS # BLD AUTO: 3.27 K/UL — SIGNIFICANT CHANGE UP (ref 1.8–7.4)
NEUTROPHILS NFR BLD AUTO: 28 % — LOW (ref 43–77)
NITRITE UR-MCNC: NEGATIVE — SIGNIFICANT CHANGE UP
NRBC # BLD: 0 /100 — SIGNIFICANT CHANGE UP (ref 0–0)
PH UR: 5 — SIGNIFICANT CHANGE UP (ref 5–8)
PLAT MORPH BLD: NORMAL — SIGNIFICANT CHANGE UP
PLATELET # BLD AUTO: 251 K/UL — SIGNIFICANT CHANGE UP (ref 150–400)
POTASSIUM SERPL-MCNC: 3.9 MMOL/L — SIGNIFICANT CHANGE UP (ref 3.5–5.3)
POTASSIUM SERPL-SCNC: 3.9 MMOL/L — SIGNIFICANT CHANGE UP (ref 3.5–5.3)
PROT SERPL-MCNC: 8.2 G/DL — SIGNIFICANT CHANGE UP (ref 6–8.3)
PROT UR-MCNC: 15 MG/DL
RBC # BLD: 4.61 M/UL — SIGNIFICANT CHANGE UP (ref 3.8–5.2)
RBC # FLD: 13.2 % — SIGNIFICANT CHANGE UP (ref 10.3–14.5)
RBC BLD AUTO: NORMAL — SIGNIFICANT CHANGE UP
RBC CASTS # UR COMP ASSIST: ABNORMAL /HPF (ref 0–2)
SODIUM SERPL-SCNC: 135 MMOL/L — SIGNIFICANT CHANGE UP (ref 135–145)
SP GR SPEC: 1.01 — SIGNIFICANT CHANGE UP (ref 1.01–1.02)
UROBILINOGEN FLD QL: NEGATIVE — SIGNIFICANT CHANGE UP
VARIANT LYMPHS # BLD: 2 % — SIGNIFICANT CHANGE UP (ref 0–6)
WBC # BLD: 11.68 K/UL — HIGH (ref 3.8–10.5)
WBC # FLD AUTO: 11.68 K/UL — HIGH (ref 3.8–10.5)
WBC UR QL: ABNORMAL /HPF (ref 0–5)

## 2023-04-03 PROCEDURE — 80053 COMPREHEN METABOLIC PANEL: CPT

## 2023-04-03 PROCEDURE — 99284 EMERGENCY DEPT VISIT MOD MDM: CPT

## 2023-04-03 PROCEDURE — 83735 ASSAY OF MAGNESIUM: CPT

## 2023-04-03 PROCEDURE — 81025 URINE PREGNANCY TEST: CPT

## 2023-04-03 PROCEDURE — 81001 URINALYSIS AUTO W/SCOPE: CPT

## 2023-04-03 PROCEDURE — 99284 EMERGENCY DEPT VISIT MOD MDM: CPT | Mod: 25

## 2023-04-03 PROCEDURE — 36415 COLL VENOUS BLD VENIPUNCTURE: CPT

## 2023-04-03 PROCEDURE — 83690 ASSAY OF LIPASE: CPT

## 2023-04-03 PROCEDURE — 96374 THER/PROPH/DIAG INJ IV PUSH: CPT

## 2023-04-03 PROCEDURE — 96361 HYDRATE IV INFUSION ADD-ON: CPT

## 2023-04-03 PROCEDURE — 85025 COMPLETE CBC W/AUTO DIFF WBC: CPT

## 2023-04-03 RX ORDER — LIDOCAINE 4 G/100G
1 CREAM TOPICAL
Qty: 5 | Refills: 0
Start: 2023-04-03 | End: 2023-04-07

## 2023-04-03 RX ORDER — OMEPRAZOLE 10 MG/1
1 CAPSULE, DELAYED RELEASE ORAL
Qty: 14 | Refills: 0
Start: 2023-04-03 | End: 2023-04-16

## 2023-04-03 RX ORDER — SODIUM CHLORIDE 9 MG/ML
1000 INJECTION INTRAMUSCULAR; INTRAVENOUS; SUBCUTANEOUS ONCE
Refills: 0 | Status: COMPLETED | OUTPATIENT
Start: 2023-04-03 | End: 2023-04-03

## 2023-04-03 RX ORDER — SUCRALFATE 1 G
1 TABLET ORAL ONCE
Refills: 0 | Status: COMPLETED | OUTPATIENT
Start: 2023-04-03 | End: 2023-04-03

## 2023-04-03 RX ORDER — FAMOTIDINE 10 MG/ML
20 INJECTION INTRAVENOUS ONCE
Refills: 0 | Status: COMPLETED | OUTPATIENT
Start: 2023-04-03 | End: 2023-04-03

## 2023-04-03 RX ORDER — LIDOCAINE 4 G/100G
1 CREAM TOPICAL ONCE
Refills: 0 | Status: COMPLETED | OUTPATIENT
Start: 2023-04-03 | End: 2023-04-03

## 2023-04-03 RX ADMIN — FAMOTIDINE 20 MILLIGRAM(S): 10 INJECTION INTRAVENOUS at 18:18

## 2023-04-03 RX ADMIN — Medication 30 MILLILITER(S): at 18:18

## 2023-04-03 RX ADMIN — SODIUM CHLORIDE 1000 MILLILITER(S): 9 INJECTION INTRAMUSCULAR; INTRAVENOUS; SUBCUTANEOUS at 18:18

## 2023-04-03 RX ADMIN — LIDOCAINE 1 PATCH: 4 CREAM TOPICAL at 21:16

## 2023-04-03 RX ADMIN — SODIUM CHLORIDE 1000 MILLILITER(S): 9 INJECTION INTRAMUSCULAR; INTRAVENOUS; SUBCUTANEOUS at 19:18

## 2023-04-03 RX ADMIN — Medication 1 GRAM(S): at 20:38

## 2023-04-03 NOTE — ED ADULT NURSE NOTE - SUICIDE SCREENING QUESTION 1
Ambulatory Case Management Note    SNF Follow-up    Questions/Answers      Responses   Acute Facility Discharged From [facility]   Acute Discharge Date 12/13/21   Name of the Skilled Nursing Facility? [facility]   Purpose of SNF Admission PT,  OT,  SN   Estimated length of stay for the patient? TBD   Who is the insurance provider or payor of patient stay? Medicare   Progression of Patient? daily therapies continue        RN-ACM outreach call made to [facility]. Staff reports pt is still there.  dept unavailable for additional information. RN-ACM will continue to monitor for discharge.    Vanesa Carlson RN  Ambulatory Case Management    1/6/2022, 09:13 EST    
No

## 2023-04-03 NOTE — ED ADULT TRIAGE NOTE - SPO2 (%)
99 Solaraze Counseling:  I discussed with the patient the risks of Solaraze including but not limited to erythema, scaling, itching, weeping, crusting, and pain.

## 2023-04-03 NOTE — ED PROVIDER NOTE - PROGRESS NOTE DETAILS
On reexam, reports pain went down from 9/10 to 4/10.  Abdomen is soft and nondistended with minimal epigastric tenderness.  Negative Lovett sign.  Patient reports still feeling uncomfortable and does not want to eat because it causes her to have more pain.  Advised to get a CT scan to assess for colitis or obstruction but patient prefers to try outpatient Rx and follow-up with her PMD/gastroenterologist.  Discussed red flags to come back to the hospital or if she does not feel better with Rx.  Will give trial of omeprazole and Maalox and discussed strict diet changes.  Patient also requesting lidocaine patch Rx for her chronic neck pain. Pt is well appearing walking with steady gait, stable for discharge and follow up without fail with medical doctor. I had a detailed discussion with the patient and/or guardian regarding the historical points, exam findings, and any diagnostic results supporting the discharge diagnosis. Pt educated on care and need for follow up. Strict return instructions and red flag signs and symptoms discussed with patient. Questions answered. Pt shows understanding of discharge information and agrees to follow.

## 2023-04-03 NOTE — ED PROVIDER NOTE - CLINICAL SUMMARY MEDICAL DECISION MAKING FREE TEXT BOX
52-year-old female, presents for evaluation of upper abdominal pain x3 days associated with intermittent loose stools.  Well-appearing, vital signs stable, afebrile.  On exam abdomen is soft and nondistended, no guarding, epigastric and left upper quadrant tenderness.  No CVAT.  Negative Lovett sign.  Patient reports that had a ultrasound of the abdomen last week and was normal.  Labs shows very mild leukocytosis with lymphocytic shift no other gross abnormalities.  No urinary symptoms.  Symptoms may be related to viral enteritis.  Trial of Pepcid, Maalox and Carafate significantly improved symptoms.  Low clinical suspicion for acute ACS or dissection.

## 2023-04-03 NOTE — ED ADULT NURSE NOTE - NSIMPLEMENTINTERV_GEN_ALL_ED
Implemented All Universal Safety Interventions:  [place] to call system. Call bell, personal items and telephone within reach. Instruct patient to call for assistance. Room bathroom lighting operational. Non-slip footwear when patient is off stretcher. Physically safe environment: no spills, clutter or unnecessary equipment. Stretcher in lowest position, wheels locked, appropriate side rails in place.

## 2023-04-03 NOTE — ED PROVIDER NOTE - ATTENDING APP SHARED VISIT CONTRIBUTION OF CARE
seen with acp  c/o abdominal pain bilaterally  had sono shich was negative  lab work ok  pain improved   refuses ct scan  agree with acps assessment hx and physical and disposition

## 2023-04-03 NOTE — ED PROVIDER NOTE - PATIENT PORTAL LINK FT
You can access the FollowMyHealth Patient Portal offered by [facility] by registering at the following website: [email or web address]. By joining [facility]’s FollowMyHealth portal, you will also be able to view your health information using other applications (apps) compatible with our system.

## 2023-04-03 NOTE — ED PROVIDER NOTE - NSFOLLOWUPINSTRUCTIONS_ED_ALL_ED_FT
Follow up with the primary care doctor within 2-3 days.  Follow up with the gastroenterologist within 1 week.  If you experience any new or worsening symptoms or if you are concerned you can always come back to the emergency for a re-evaluation.  If there were any prescriptions given to you during the visit today take them as prescribed. If you have any questions you can ask the pharmacist.

## 2023-04-03 NOTE — ED PROVIDER NOTE - OBJECTIVE STATEMENT
52-year-old female, no significant past medical history, presents for evaluation of abdominal pain x1 week.  Initially reports went to a restaurant that she usually eats at and soon after developed upper abdominal pain.  Pain is continuous with intermittent intensity.  Pain is much worse after eating. pain mostly located to the epigastric and left upper quadrant area.  Associated at times with some nausea but without vomiting.  Also reports intermittent loose stools every other day.  Denies any BRBPR or melena.  Denies any recent national travel.  Denies any fever, chills, night sweats, urinary symptoms or any other complaints.  1 week ago had ultrasound of the abdomen which patient says was completely normal.No diarrhea today. 52-year-old female, no significant past medical history, presents for evaluation of abdominal pain x1 week.  Initially reports went to a restaurant that she usually eats at and soon after developed upper abdominal pain.  Pain is continuous with intermittent intensity.  Pain is much worse after eating. pain mostly located to the epigastric and left upper quadrant area.  Associated at times with some nausea but without vomiting.  Also reports intermittent loose stools every other day.  Denies any BRBPR or melena.  Denies any recent national travel.  Denies any fever, chills, night sweats, urinary symptoms or any other complaints.  1 week ago had ultrasound of the abdomen which patient says was completely normal. No diarrhea today.

## 2023-04-03 NOTE — ED ADULT NURSE NOTE - NS_NURSE_DISC_TEACHING_YN_ED_ALL_ED
" Patient was seen and examined.   Attestation Notes: Face to face encounter completed    Subjective    The patient having better pain control with change in medications.  Hydrocodone helping more than tramadol.  No side effects.  Still continues to have sleep dysfunction.  Patient min assist to touch assist with transfers.  Improving with hopping using a rolling walker.  Objective     Visit Vitals  /73 (BP Location: Left upper arm, Patient Position: Sitting)   Pulse 68   Temp 36.7 °C (98 °F) (Oral)   Resp 18   Ht 1.854 m (6' 1\")   Wt 69.4 kg (153 lb)   SpO2 97%   BMI 20.19 kg/m²     Review of Systems:  Pertinent items are noted in HPI.  Denies chest pain and shortness of breath.  Review of systems otherwise negative.    Labs     Results from last 7 days   Lab Units 12/12/22  0749   WBC K/uL 12.83*   HEMOGLOBIN g/dL 12.1*   HEMATOCRIT % 38.3*   PLATELETS K/uL 534*     Results from last 7 days   Lab Units 12/12/22  0702   SODIUM mEQ/L 135*   POTASSIUM mEQ/L 5.0   CHLORIDE mEQ/L 99   CO2 mEQ/L 27   BUN mg/dL 18   CREATININE mg/dL 0.7*   CALCIUM mg/dL 9.8   ALBUMIN g/dL 2.9*   BILIRUBIN TOTAL mg/dL 0.2*   ALK PHOS IU/L 140*   ALT IU/L 42   AST IU/L 29   GLUCOSE mg/dL 85     Full Code    Physical Exam  General      Alert, cooperative, no distress, appears stated age.   Head:    Normocephalic, without obvious abnormality, atraumatic.   Eyes:    PERRL, conjunctiva/corneas clear, EOM's intact.        Nose:   Nares normal, septum midline, mucosa normal, no drainage or            sinus tenderness.   Throat:   Lips, mucosa, and tongue normal.    Neck:   Supple, symmetrical, trachea midline.    Back:     Symmetric, no curvature.   Lungs:     Clear to auscultation bilaterally, respirations unlabored.   Chest wall:    No tenderness or deformity.   Heart:    Regular rate and rhythm, S1 and S2 normal.   Abdomen:     Soft, non-tender, bowel sounds active all four quadrants,     no masses, no organomegaly. "   Extremities:  Musculoskeletal:  Stable stump edema.  Right transtibial amputation.     Pulses:   1+ and symmetric all extremities.   Skin:  Stable pressure injury over tibial tuberosity.  Incision intact.   Neurologic:          Behavior/  Emotional:  CNII-XII intact.  Alert and oriented ×3.  Motor exam intact.  Sensory exam intact.  Reflexes stable decreased reflexes.      Appropriate, cooperative           Plan of care was discussed with patient    Yes